# Patient Record
(demographics unavailable — no encounter records)

---
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
## 2017-08-31 NOTE — RAD
EXAM: Head CT without contrast.

 

HISTORY: Fall. Nausea and vomiting.

 

TECHNIQUE: Computed tomographic images of the head were obtained without 

contrast. 

*One or more of the following individualized dose reduction techniques 

were utilized for this examination:  

1. Automated exposure control.  

2. Adjustment of the mA and/or kV according to patient size.  

3. Use of iterative reconstruction technique.

 

COMPARISON: None.

 

FINDINGS: There is no acute or subacute extra-axial or intraparenchymal 

hemorrhage. There is no mass effect or midline shift. There is no 

hydrocephalus. The gray-white matter differentiation pattern is intact. 

There is mild to moderate paranasal sinus because of thickening, partially

included on the field-of-view. The mastoid air cells are clear. The 

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 No calvarial fracture is 

seen.

 

IMPRESSION: 

 

No acute intracranial findings.

 

Electronically signed by: Chelsea Che MD (8/31/2017 9:47 PM) [facility]
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
## 2018-03-10 NOTE — ED.ADGEN
Past History


Past Medical History:  Asthma


Past Surgical History:  No Surgical History


Smoking:  Non-smoker


Alcohol Use:  None


Drug Use:  None





Adult General


Chief Complaint


Chief Complaint


"  I was in here with the other one... and now this one is sick.. the other one 

had a virus syndrome.." ( Father)





HPI


HPI





Patient is a 6 year old female who presents with above hx and complaints of 

fever, chills, malaise, pharyngitis. Sister recently diagnosed with viral 

syndrome. This child  is up-to-date with vaccinations but did not receive a flu 

vaccination this fall. Normally healthy. Has been around sister who was 

diagnosed with viral syndrome and other sick children at school. No recent 

travel.





Review of Systems


Review of Systems





Constitutional: History of fever or chills []


Eyes: Denies change in visual acuity, redness, or eye pain []


HENT: Denies nasal congestion history of sore throat []


Respiratory: Denies cough or shortness of breath []


Cardiovascular: No additional information not addressed in HPI []


GI: Denies abdominal pain, nausea, vomiting, bloody stools or diarrhea []


: Denies dysuria or hematuria []


Musculoskeletal: Denies back pain or joint pain []


Integument: Denies rash or skin lesions []


Neurologic: Denies headache, focal weakness or sensory changes []


Endocrine: Denies polyuria or polydipsia []





All other systems were reviewed and found to be within normal limits, except as 

documented in this note.





Family History


Family History


Sister has a viral syndrome





Current Medications


Current Medications





Current Medications








 Medications


  (Trade)  Dose


 Ordered  Sig/Brando  Start Time


 Stop Time Status Last Admin


Dose Admin


 


 Amoxicillin


  (Starter Pack -


 Amoxicillin 250mg/


 5ml 80ml)  1 startpack  1X  ONCE  3/10/18 23:45


 3/10/18 23:46 DC 3/10/18 23:54


1 STARTPACK


 


 Diphenhydramine


 HCl


  (Benadryl Oral


 Elixir)  20 mg  1X  ONCE  3/10/18 23:45


 3/10/18 23:46 DC 3/10/18 23:54


20 MG


 


 Ibuprofen


  (Motrin)  200 mg  1X  ONCE  3/10/18 23:45


 3/10/18 23:46 DC 3/10/18 23:54


200 MG


 


 Prednisolone


 Sodium Phosphate


  (Orapred)  20 mg  1X  ONCE  3/10/18 23:45


 3/10/18 23:46 DC 3/10/18 23:45


20 MG











Allergies


Allergies





Allergies








Coded Allergies Type Severity Reaction Last Updated Verified


 


  No Known Drug Allergies    3/8/14 No











Physical Exam


Physical Exam





Constitutional: Well developed, well nourished, moderately acute  distress, non-

toxic appearance. []


HENT: Normocephalic, atraumatic, bilateral external ears normal, oropharynx 

moist, injected pharynx, no oral exudates, nose normal. []


Eyes: PERRLA, EOMI, conjunctiva normal, no discharge. [] 


Neck: Normal range of motion, no tenderness, supple, no stridor. [] Cervical 

adenopathy in the anterior chain


Cardiovascular: Tachycardia Heart rate regular rhythm, no murmur []


Lungs & Thorax:  Bilateral breath sounds clear to auscultation []


Abdomen: Bowel sounds normal, soft, no tenderness, no masses, no pulsatile 

masses. [] 


Skin: Warm, dry, no erythema, no rash. [] 


Back: No tenderness, no CVA tenderness. [] 


Extremities: No tenderness, no cyanosis, no clubbing, ROM intact, no edema. [] 


Neurologic: Alert and oriented X 3, normal motor function, normal sensory 

function, no focal deficits noted. []


Psychologic: Affect anxious, judgement normal, mood normal. []





Current Patient Data


Lab Results





 Laboratory Tests








Test


  3/10/18


23:00


 


Influenza Type A (Rapid)


  Negative


(NEGATIVE)


 


Influenza Type B (Rapid)


  Negative


(NEGATIVE)


 


Group A Streptococcus Rapid


  Positive


(NEGATIVE)











EKG


EKG


[]





Radiology/Procedures


Radiology/Procedures


[]





Course & Med Decision Making


Course & Med Decision Making


Pertinent Labs and Imaging studies reviewed. (See chart for details).





Gargle with Listerine or warm salt water 4 times a day and as needed. Take 

amoxicillin 500 mg 3 times a day for 7 days. Take Benadryl up to 4 times a day 

for congestion and drainage. Take Tylenol and ibuprofen over-the-counter 

control temperature. Push fluids. Return if any concerns. Follow up with 

primary care.





[]





Final Impression


Final Impression


1. Viral Syndrome[]


2. Strep pharyngitis


Problems:  





Dragon Disclaimer


Dragon Disclaimer


This electronic medical record was generated, in whole or in part, using a 

voice recognition dictation system.











CARIE CAI MD Mar 10, 2018 22:27

## 2018-03-23 NOTE — PHYS DOC
Past History


Past Medical History:  No Pertinent History


Past Surgical History:  No Surgical History


Smoking:  Non-smoker


Alcohol Use:  None


Drug Use:  None





Adult General


Chief Complaint


Chief Complaint:  SORE THROAT





HPI


HPI





Patient is a 6-year-old female who presents here today secondary to a sore 

throat. Father reports patient just finished a course of amoxicillin 2 days ago 

for strep throat. Reports she improved with asymptomatic. Went to a friend's 

house and today her throat is hurting again. No fevers at home. Patient reports 

pain with swallowing. No ear pain. No cough. No abdominal pain. No nausea 

vomiting or diarrhea. Father as well as the patient requested an IM injection 

to help her with her infection.





Review of systems:





Constitutional: Denies fever or chills 


Eyes: Denies change in visual acuity, redness, or eye pain 


HENT:  sore throat 


All other review systems are negative except as documented in the history of 

present illness portion.





Physical exam:





Constitutional: Well developed, well nourished, no acute distress, non-toxic 

appearance. 


HENT: Normocephalic, atraumatic, bilateral external ears normal, nose normal. 

Oropharynx erythematous. Minimal swelling. No exudates. Mild tender bilateral 

submandibular lymphadenopathy. 


Eyes:  EOMI, conjunctiva normal, no discharge.  


Neck: Normal range of motion, no tenderness, supple, no stridor.  


Cardiovascular:Heart rate regular rhythm


Lungs & Thorax:  Bilateral breath sounds clear to auscultation no respiratory 

distress


Abdomen: Bowel sounds normal, soft, no tenderness, no masses, no pulsatile 

masses.  


Skin: Warm, dry, no erythema, no rash.  


Back: No tenderness, no CVA tenderness.  


Extremities: No tenderness, no cyanosis, no clubbing, ROM intact, no edema.  


Neurologic: Alert and oriented X 3, normal motor function, normal sensory 

function, no focal deficits noted. 


Psychologic: Affect normal, judgement normal, mood normal. 








6-year-old presents here today with sore throat likely strep given her 

symptoms. It's likely that the amoxicillin that she just finished has not been 

completely treated her strep. Strep screen not indicated given her symptoms as 

well as recent antibiotic use. Patient be given Rocephin 50 mg IM per patient 

and family father's request. Patient was sent home with a prescription for 

Zithromax.





Current Medications


Current Medications





Current Medications








 Medications


  (Trade)  Dose


 Ordered  Sig/Brando  Start Time


 Stop Time Status Last Admin


Dose Admin


 


 Ibuprofen


  (Motrin)  220 mg  1X  ONCE  3/23/18 00:30


 3/23/18 00:31 UNV  


 











Allergies


Allergies





Allergies








Coded Allergies Type Severity Reaction Last Updated Verified


 


  No Known Drug Allergies    3/8/14 No











EKG


EKG


[]





Radiology/Procedures


Radiology/Procedures


[]





Course & Med Decision Making


Course & Med Decision Making


Pertinent Labs and Imaging studies reviewed. (See chart for details)





[]





Dragon Disclaimer


Dragon Disclaimer


This electronic medical record was generated, in whole or in part, using a 

voice recognition dictation system.





Departure


Departure:


Impression:  


 Primary Impression:  


 Sore throat


Disposition:  01 HOME, SELF-CARE


Condition:  IMPROVED


Referrals:  


JOSLYN SANDS RN, FNP-BC (PCP)


Patient Instructions:  Strep Throat


Scripts


Azithromycin (ZITHROMAX ORAL SUSP) 200 Mg/5 Ml Susp.recon


200 MG PO DAILY for ANTI-BIOTIC for 5 Days, #15 ML 0 Refills


   200 mg po day 1


   100 mg po day 2-5


   Prov: GABRIEL BOO MD         3/23/18











GABRIEL BOO MD Mar 23, 2018 00:36